# Patient Record
Sex: MALE | Race: WHITE
[De-identification: names, ages, dates, MRNs, and addresses within clinical notes are randomized per-mention and may not be internally consistent; named-entity substitution may affect disease eponyms.]

---

## 2017-02-26 ENCOUNTER — HOSPITAL ENCOUNTER (EMERGENCY)
Dept: HOSPITAL 4 - SED | Age: 67
Discharge: HOME | End: 2017-02-26
Payer: COMMERCIAL

## 2017-02-26 VITALS
TEMPERATURE: 98 F | DIASTOLIC BLOOD PRESSURE: 97 MMHG | SYSTOLIC BLOOD PRESSURE: 152 MMHG | HEART RATE: 85 BPM | RESPIRATION RATE: 16 BRPM | OXYGEN SATURATION: 98 %

## 2017-02-26 VITALS — WEIGHT: 223 LBS | BODY MASS INDEX: 31.92 KG/M2 | HEIGHT: 70 IN

## 2017-02-26 VITALS
RESPIRATION RATE: 16 BRPM | OXYGEN SATURATION: 99 % | DIASTOLIC BLOOD PRESSURE: 88 MMHG | SYSTOLIC BLOOD PRESSURE: 145 MMHG | TEMPERATURE: 97.6 F | HEART RATE: 82 BPM

## 2017-02-26 DIAGNOSIS — A02.9: ICD-10-CM

## 2017-02-26 DIAGNOSIS — I10: ICD-10-CM

## 2017-02-26 DIAGNOSIS — R19.7: Primary | ICD-10-CM

## 2017-02-26 LAB
ALBUMIN SERPL BCP-MCNC: 3.6 G/DL (ref 3.4–4.8)
ALT SERPL W P-5'-P-CCNC: 41 U/L (ref 12–78)
ANION GAP SERPL CALCULATED.3IONS-SCNC: 7 MMOL/L (ref 5–15)
APPEARANCE UR: CLEAR
AST SERPL W P-5'-P-CCNC: 33 U/L (ref 10–37)
BASOPHILS # BLD AUTO: 0 K/UL (ref 0–0.2)
BASOPHILS NFR BLD AUTO: 0.3 % (ref 0–2)
BILIRUB SERPL-MCNC: 0.3 MG/DL (ref 0–1)
BILIRUB UR QL STRIP: NEGATIVE
BUN SERPL-MCNC: 18 MG/DL (ref 8–21)
CALCIUM SERPL-MCNC: 8.1 MG/DL (ref 8.4–11)
CHLORIDE SERPL-SCNC: 107 MMOL/L (ref 98–107)
COLOR UR: YELLOW
CREAT SERPL-MCNC: 0.79 MG/DL (ref 0.55–1.3)
EOSINOPHIL # BLD AUTO: 0.2 K/UL (ref 0–0.4)
EOSINOPHIL NFR BLD AUTO: 3 % (ref 0–4)
ERYTHROCYTE [DISTWIDTH] IN BLOOD BY AUTOMATED COUNT: 13.7 % (ref 9–15)
GFR SERPL CREATININE-BSD FRML MDRD: 126 ML/MIN (ref 90–?)
GLUCOSE SERPL-MCNC: 121 MG/DL (ref 70–99)
GLUCOSE UR STRIP-MCNC: NEGATIVE MG/DL
HCT VFR BLD AUTO: 44.5 % (ref 36–54)
HGB BLD-MCNC: 14.8 G/DL (ref 14–18)
HGB UR QL STRIP: NEGATIVE
INR PPP: 0.9 (ref 0.8–1.2)
KETONES UR STRIP-MCNC: NEGATIVE MG/DL
LEUKOCYTE ESTERASE UR QL STRIP: NEGATIVE
LYMPHOCYTES # BLD AUTO: 0.7 K/UL (ref 1–5.5)
LYMPHOCYTES NFR BLD AUTO: 12.3 % (ref 20.5–51.5)
MCH RBC QN AUTO: 30 PG (ref 27–31)
MCHC RBC AUTO-ENTMCNC: 33 % (ref 32–36)
MCV RBC AUTO: 91 FL (ref 79–98)
MONOCYTES # BLD MANUAL: 0.6 K/UL (ref 0–1)
MONOCYTES # BLD MANUAL: 10.3 % (ref 1.7–9.3)
NEUTROPHILS # BLD AUTO: 4.3 K/UL (ref 1.8–7.7)
NEUTROPHILS NFR BLD AUTO: 74.1 % (ref 40–70)
NITRITE UR QL STRIP: NEGATIVE
PH UR STRIP: 6.5 [PH] (ref 5–8)
PLATELET # BLD AUTO: 238 K/UL (ref 130–430)
POTASSIUM SERPL-SCNC: 3.8 MMOL/L (ref 3.5–5.1)
PROT SERPL-MCNC: 7 G/DL (ref 6.4–8.3)
PROT UR QL STRIP: (no result)
PROTHROMBIN TIME: 10.3 SECS (ref 9.5–12.5)
RBC # BLD AUTO: 4.91 MIL/UL (ref 4.2–6.2)
SODIUM SERPLBLD-SCNC: 139 MMOL/L (ref 136–145)
SP GR UR STRIP: 1.02 (ref 1–1.03)
UROBILINOGEN UR STRIP-MCNC: 0.2 MG/DL (ref 0.2–1)
WBC # BLD AUTO: 5.8 K/UL (ref 4.8–10.8)

## 2017-02-26 PROCEDURE — 87046 STOOL CULTR AEROBIC BACT EA: CPT

## 2017-02-26 PROCEDURE — 81003 URINALYSIS AUTO W/O SCOPE: CPT

## 2017-02-26 PROCEDURE — 99284 EMERGENCY DEPT VISIT MOD MDM: CPT

## 2017-02-26 PROCEDURE — 85610 PROTHROMBIN TIME: CPT

## 2017-02-26 PROCEDURE — 87045 FECES CULTURE AEROBIC BACT: CPT

## 2017-02-26 PROCEDURE — 89055 LEUKOCYTE ASSESSMENT FECAL: CPT

## 2017-02-26 PROCEDURE — 96374 THER/PROPH/DIAG INJ IV PUSH: CPT

## 2017-02-26 PROCEDURE — 96361 HYDRATE IV INFUSION ADD-ON: CPT

## 2017-02-26 PROCEDURE — 85025 COMPLETE CBC W/AUTO DIFF WBC: CPT

## 2017-02-26 PROCEDURE — 36415 COLL VENOUS BLD VENIPUNCTURE: CPT

## 2017-02-26 PROCEDURE — 80053 COMPREHEN METABOLIC PANEL: CPT

## 2017-02-26 NOTE — NUR
Received report, paitient in stable condition, visitor present. Patient states 
has had diarrhea for 24 hrs and believes he has salmonella poisoning. States 
abdomen feels upset but denies pain. Denies nausea, vomiting or blood in stool. 
Abdomen soft and non distended. Black color noted to tongue. No other 
complaints/injuries per patient or noted.

## 2017-02-26 NOTE — NUR
Placed in room 07  . Placed on cardiac monitor, blood pressure machine and 
pulse oximeter. To gown for exam. Side rails up. Report given to DANIELLE Rose.

## 2017-02-26 NOTE — NUR
Patient given written and verbal discharge instructions and verbalizes 
understanding.  ER MD discussed with patient the results and treatment 
provided. Given copies of tests performed in ER. Patient in stable condition. 
ID arm band removed. IV catheter removed intact and dressing applied, no active 
bleeding.

Rx of Cipro and Zofran given. Patient educated on pain management and to follow 
up with PMD. Pain Scale 0/10.

Opportunity for questions provided and answered.

## 2017-02-26 NOTE — NUR
Medication reconciliation completed with information provided by pt. Any prior 
medication reconciliation on file was reviewed and corrected.

## 2017-03-14 ENCOUNTER — HOSPITAL ENCOUNTER (OUTPATIENT)
Dept: HOSPITAL 4 - SRD | Age: 67
Discharge: HOME | End: 2017-03-14
Attending: INTERNAL MEDICINE
Payer: COMMERCIAL

## 2017-03-14 DIAGNOSIS — M51.26: ICD-10-CM

## 2017-03-14 DIAGNOSIS — Z01.818: Primary | ICD-10-CM

## 2017-03-14 DIAGNOSIS — M47.894: ICD-10-CM

## 2017-03-14 DIAGNOSIS — R05: ICD-10-CM

## 2020-01-02 ENCOUNTER — HOSPITAL ENCOUNTER (EMERGENCY)
Dept: HOSPITAL 4 - SED | Age: 70
Discharge: HOME | End: 2020-01-02
Payer: COMMERCIAL

## 2020-01-02 VITALS — BODY MASS INDEX: 35.36 KG/M2 | HEIGHT: 70 IN | SYSTOLIC BLOOD PRESSURE: 195 MMHG | WEIGHT: 247 LBS

## 2020-01-02 VITALS — SYSTOLIC BLOOD PRESSURE: 145 MMHG

## 2020-01-02 DIAGNOSIS — R11.0: ICD-10-CM

## 2020-01-02 DIAGNOSIS — I10: ICD-10-CM

## 2020-01-02 DIAGNOSIS — R10.12: Primary | ICD-10-CM

## 2020-01-02 LAB
ALBUMIN SERPL BCP-MCNC: 3.9 G/DL (ref 3.4–4.8)
ALT SERPL W P-5'-P-CCNC: 45 U/L (ref 12–78)
ANION GAP SERPL CALCULATED.3IONS-SCNC: 13 MMOL/L (ref 5–15)
AST SERPL W P-5'-P-CCNC: 27 U/L (ref 10–37)
BASOPHILS # BLD AUTO: 0.1 K/UL (ref 0–0.2)
BASOPHILS NFR BLD AUTO: 0.5 % (ref 0–2)
BILIRUB SERPL-MCNC: 0.7 MG/DL (ref 0–1)
BUN SERPL-MCNC: 15 MG/DL (ref 8–21)
CALCIUM SERPL-MCNC: 9.2 MG/DL (ref 8.4–11)
CHLORIDE SERPL-SCNC: 99 MMOL/L (ref 98–107)
CREAT SERPL-MCNC: 0.85 MG/DL (ref 0.55–1.3)
EOSINOPHIL # BLD AUTO: 0 K/UL (ref 0–0.4)
EOSINOPHIL NFR BLD AUTO: 0 % (ref 0–4)
ERYTHROCYTE [DISTWIDTH] IN BLOOD BY AUTOMATED COUNT: 13.7 % (ref 9–15)
GFR SERPL CREATININE-BSD FRML MDRD: 115 ML/MIN (ref 90–?)
GLUCOSE SERPL-MCNC: 180 MG/DL (ref 70–99)
HCT VFR BLD AUTO: 47.2 % (ref 36–54)
HGB BLD-MCNC: 16 G/DL (ref 14–18)
LIPASE SERPL-CCNC: 87 U/L (ref 73–393)
LYMPHOCYTES # BLD AUTO: 0.8 K/UL (ref 1–5.5)
LYMPHOCYTES NFR BLD AUTO: 4.6 % (ref 20.5–51.5)
MCH RBC QN AUTO: 32 PG (ref 27–31)
MCHC RBC AUTO-ENTMCNC: 34 % (ref 32–36)
MCV RBC AUTO: 94 FL (ref 79–98)
MONOCYTES # BLD MANUAL: 0.8 K/UL (ref 0–1)
MONOCYTES # BLD MANUAL: 4.3 % (ref 1.7–9.3)
NEUTROPHILS # BLD AUTO: 16.6 K/UL (ref 1.8–7.7)
NEUTROPHILS NFR BLD AUTO: 90.6 % (ref 40–70)
PLATELET # BLD AUTO: 255 K/UL (ref 130–430)
POTASSIUM SERPL-SCNC: 3.3 MMOL/L (ref 3.5–5.1)
RBC # BLD AUTO: 5.02 MIL/UL (ref 4.2–6.2)
SODIUM SERPLBLD-SCNC: 136 MMOL/L (ref 136–145)
WBC # BLD AUTO: 18.3 K/UL (ref 4.8–10.8)

## 2020-01-02 PROCEDURE — 80053 COMPREHEN METABOLIC PANEL: CPT

## 2020-01-02 PROCEDURE — 84484 ASSAY OF TROPONIN QUANT: CPT

## 2020-01-02 PROCEDURE — 74021 RADEX ABDOMEN 3+ VIEWS: CPT

## 2020-01-02 PROCEDURE — 96375 TX/PRO/DX INJ NEW DRUG ADDON: CPT

## 2020-01-02 PROCEDURE — 71275 CT ANGIOGRAPHY CHEST: CPT

## 2020-01-02 PROCEDURE — 85025 COMPLETE CBC W/AUTO DIFF WBC: CPT

## 2020-01-02 PROCEDURE — 96374 THER/PROPH/DIAG INJ IV PUSH: CPT

## 2020-01-02 PROCEDURE — 83690 ASSAY OF LIPASE: CPT

## 2020-01-02 PROCEDURE — 36415 COLL VENOUS BLD VENIPUNCTURE: CPT

## 2020-01-02 PROCEDURE — 74177 CT ABD & PELVIS W/CONTRAST: CPT

## 2020-01-02 PROCEDURE — 99284 EMERGENCY DEPT VISIT MOD MDM: CPT

## 2020-01-02 NOTE — NUR
REASSESSMENT; PATIENT STATES HIS PAIN IS IMPROVED; RADIOLOGY PROCEDURE PENDING; 
OTHERWISE UNCHANGED

## 2020-01-02 NOTE — NUR
REASSESSMENT BY ERMD; ACI GIVEN AND PATIENT INDICATED FULL UNDERSTANDING; EKG 
CANCELLED PER ERMD; IV'S OUT AND DRESSED; PATIENT DISCHARGED AMBULATORY WITH 
WIFE AT 1120 MARKEDLY IMPROVED CONDITION

## 2020-01-02 NOTE — NUR
FULL HEAD TO TOE ASSESSMENT; PATIENT STATES SEVERE LEFT UPPER QUADRANT PAIN FOR 
8 HOURS; NO TRAUMA, NO OTHER REMARKABLE S/S; MODERATE TENDERNESS TO LEFT UPPER 
QUADRANT;  TO ER BED #8 0730 AND EVALUATION BY NIKOLASD AT 0745

## 2021-07-08 ENCOUNTER — HOSPITAL ENCOUNTER (EMERGENCY)
Dept: HOSPITAL 4 - SED | Age: 71
Discharge: HOME | End: 2021-07-08
Payer: COMMERCIAL

## 2021-07-08 VITALS — SYSTOLIC BLOOD PRESSURE: 149 MMHG

## 2021-07-08 VITALS — WEIGHT: 280 LBS | BODY MASS INDEX: 41.47 KG/M2 | HEIGHT: 69 IN

## 2021-07-08 VITALS — SYSTOLIC BLOOD PRESSURE: 151 MMHG

## 2021-07-08 DIAGNOSIS — S40.012A: Primary | ICD-10-CM
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
## 2021-07-08 DIAGNOSIS — Z79.899: ICD-10-CM

## 2021-07-08 DIAGNOSIS — S20.212A: ICD-10-CM
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
## 2021-07-08 DIAGNOSIS — I10: ICD-10-CM

## 2021-07-08 PROCEDURE — 71110 X-RAY EXAM RIBS BIL 3 VIEWS: CPT

## 2021-07-08 PROCEDURE — 96372 THER/PROPH/DIAG INJ SC/IM: CPT

## 2021-07-08 PROCEDURE — 99283 EMERGENCY DEPT VISIT LOW MDM: CPT

## 2023-08-28 ENCOUNTER — HOSPITAL ENCOUNTER (EMERGENCY)
Dept: HOSPITAL 4 - SED | Age: 73
Discharge: HOME | End: 2023-08-28
Payer: COMMERCIAL

## 2023-08-28 VITALS — RESPIRATION RATE: 16 BRPM | SYSTOLIC BLOOD PRESSURE: 151 MMHG | OXYGEN SATURATION: 96 % | HEART RATE: 67 BPM

## 2023-08-28 VITALS
HEART RATE: 72 BPM | SYSTOLIC BLOOD PRESSURE: 144 MMHG | OXYGEN SATURATION: 98 % | TEMPERATURE: 98.3 F | RESPIRATION RATE: 18 BRPM

## 2023-08-28 VITALS — HEIGHT: 70 IN | WEIGHT: 255 LBS | BODY MASS INDEX: 36.51 KG/M2

## 2023-08-28 DIAGNOSIS — Q61.9: Primary | ICD-10-CM

## 2023-08-28 DIAGNOSIS — I10: ICD-10-CM

## 2023-08-28 DIAGNOSIS — R10.9: ICD-10-CM

## 2023-08-28 DIAGNOSIS — Z79.899: ICD-10-CM

## 2023-08-28 LAB
ALBUMIN SERPL BCP-MCNC: 3.5 G/DL (ref 3.4–4.8)
ALT SERPL W P-5'-P-CCNC: 38 U/L (ref 12–78)
ANION GAP SERPL CALCULATED.3IONS-SCNC: 9 MMOL/L (ref 5–15)
APPEARANCE UR: CLEAR
AST SERPL W P-5'-P-CCNC: 32 U/L (ref 10–37)
BASOPHILS # BLD AUTO: 0 K/UL (ref 0–0.2)
BASOPHILS NFR BLD AUTO: 0.4 % (ref 0–2)
BILIRUB SERPL-MCNC: 0.6 MG/DL (ref 0–1)
BILIRUB UR QL STRIP: NEGATIVE
BUN SERPL-MCNC: 25 MG/DL (ref 8–21)
CALCIUM SERPL-MCNC: 9.9 MG/DL (ref 8.4–11)
CHLORIDE SERPL-SCNC: 106 MMOL/L (ref 98–107)
CO2 SERPLBLD-SCNC: 29 MMOL/L (ref 23–29)
COLOR UR: YELLOW
CREAT SERPL-MCNC: 1.06 MG/DL (ref 0.55–1.3)
EOSINOPHIL # BLD AUTO: 0.2 K/UL (ref 0–0.4)
EOSINOPHIL NFR BLD AUTO: 1.8 % (ref 0–4)
ERYTHROCYTE [DISTWIDTH] IN BLOOD BY AUTOMATED COUNT: 14.4 % (ref 9–15)
GFR SERPL CREATININE-BSD FRML MDRD: (no result) ML/MIN (ref 90–?)
GLUCOSE SERPL-MCNC: 133 MG/DL (ref 74–106)
GLUCOSE UR STRIP-MCNC: NEGATIVE MG/DL
HCT VFR BLD AUTO: 44.6 % (ref 36–54)
HGB BLD-MCNC: 14.5 G/DL (ref 14–18)
HGB UR QL STRIP: NEGATIVE
KETONES UR STRIP-MCNC: NEGATIVE MG/DL
LEUKOCYTE ESTERASE UR QL STRIP: NEGATIVE
LYMPHOCYTES # BLD AUTO: 1.8 K/UL (ref 1–5.5)
LYMPHOCYTES NFR BLD AUTO: 19.4 % (ref 20.5–51.5)
MCH RBC QN AUTO: 31 PG (ref 27–31)
MCHC RBC AUTO-ENTMCNC: 32 % (ref 32–36)
MCV RBC AUTO: 94 FL (ref 79–98)
MONOCYTES # BLD MANUAL: 0.7 K/UL (ref 0–1)
MONOCYTES # BLD MANUAL: 7.8 % (ref 1.7–9.3)
NEUTROPHILS # BLD AUTO: 6.6 K/UL (ref 1.8–7.7)
NEUTROPHILS NFR BLD AUTO: 70.6 % (ref 40–70)
NITRITE UR QL STRIP: NEGATIVE
PH UR STRIP: 6 [PH] (ref 5–8)
PLATELET # BLD AUTO: 240 K/UL (ref 130–430)
POTASSIUM SERPL-SCNC: 4.5 MMOL/L (ref 3.5–5.1)
PROT SERPL-MCNC: 6.9 G/DL (ref 6.4–8.3)
PROT UR QL STRIP: NEGATIVE
RBC # BLD AUTO: 4.73 MIL/UL (ref 4.2–6.2)
SODIUM SERPLBLD-SCNC: 144 MMOL/L (ref 136–145)
SP GR UR STRIP: >=1.03 (ref 1–1.03)
UROBILINOGEN UR STRIP-MCNC: 1 MG/DL (ref 0.2–1)
WBC # BLD AUTO: 9.3 K/UL (ref 4.8–10.8)

## 2023-08-28 PROCEDURE — 76376 3D RENDER W/INTRP POSTPROCES: CPT

## 2023-08-28 PROCEDURE — 85025 COMPLETE CBC W/AUTO DIFF WBC: CPT

## 2023-08-28 PROCEDURE — 36415 COLL VENOUS BLD VENIPUNCTURE: CPT

## 2023-08-28 PROCEDURE — 93005 ELECTROCARDIOGRAM TRACING: CPT

## 2023-08-28 PROCEDURE — 74176 CT ABD & PELVIS W/O CONTRAST: CPT

## 2023-08-28 PROCEDURE — 84484 ASSAY OF TROPONIN QUANT: CPT

## 2023-08-28 PROCEDURE — 96372 THER/PROPH/DIAG INJ SC/IM: CPT

## 2023-08-28 PROCEDURE — 80053 COMPREHEN METABOLIC PANEL: CPT

## 2023-08-28 PROCEDURE — 99285 EMERGENCY DEPT VISIT HI MDM: CPT

## 2023-08-28 PROCEDURE — 81003 URINALYSIS AUTO W/O SCOPE: CPT
